# Patient Record
Sex: MALE | Race: WHITE | NOT HISPANIC OR LATINO | URBAN - METROPOLITAN AREA
[De-identification: names, ages, dates, MRNs, and addresses within clinical notes are randomized per-mention and may not be internally consistent; named-entity substitution may affect disease eponyms.]

---

## 2024-04-04 ENCOUNTER — OFFICE VISIT (OUTPATIENT)
Dept: URGENT CARE | Facility: CLINIC | Age: 49
End: 2024-04-04
Payer: MEDICAID

## 2024-04-04 VITALS
SYSTOLIC BLOOD PRESSURE: 128 MMHG | TEMPERATURE: 98.3 F | WEIGHT: 195 LBS | HEART RATE: 86 BPM | RESPIRATION RATE: 18 BRPM | DIASTOLIC BLOOD PRESSURE: 54 MMHG | OXYGEN SATURATION: 97 % | HEIGHT: 73 IN | BODY MASS INDEX: 25.84 KG/M2

## 2024-04-04 DIAGNOSIS — J02.9 VIRAL PHARYNGITIS: Primary | ICD-10-CM

## 2024-04-04 LAB — POC RAPID STREP: NEGATIVE

## 2024-04-04 PROCEDURE — 99213 OFFICE O/P EST LOW 20 MIN: CPT

## 2024-04-04 PROCEDURE — 87880 STREP A ASSAY W/OPTIC: CPT | Mod: QW

## 2024-04-04 PROCEDURE — 87880 STREP A ASSAY W/OPTIC: CPT

## 2024-04-04 RX ORDER — ALBUTEROL SULFATE 90 UG/1
2 AEROSOL, METERED RESPIRATORY (INHALATION) EVERY 6 HOURS PRN
COMMUNITY
Start: 2023-11-05

## 2024-04-04 RX ORDER — ESCITALOPRAM OXALATE 20 MG/1
20 TABLET ORAL DAILY
COMMUNITY
Start: 2024-02-23

## 2024-04-04 RX ORDER — ASPIRIN 81 MG/1
81 TABLET ORAL DAILY
COMMUNITY

## 2024-04-04 RX ORDER — HYDROXYZINE HYDROCHLORIDE 25 MG/1
25 TABLET, FILM COATED ORAL NIGHTLY PRN
COMMUNITY
Start: 2024-02-23

## 2024-04-04 RX ORDER — ALPRAZOLAM 0.25 MG/1
TABLET ORAL
COMMUNITY
Start: 2023-10-17

## 2024-04-04 NOTE — PROGRESS NOTES
[facility] URGENT CARE [place] NOTE:      Name: Hamilton Villalta, 48 y.o.    CSN:[id number]   MRN:[id number]    PCP: No primary care provider on file.    ALL:  No Known Allergies    History:    Chief Complaint: Sore Throat (Sore throat x 2 days. Known exposure to strep )    Encounter Date: 4/4/2024      HPI: The history was obtained from the patient. Hamilton is a 48 y.o. male, who presents with a chief complaint of Sore Throat (Sore throat x 2 days. Known exposure to strep ).  Patient states that his son currently has strep.  He has developed a sore throat over the last 2 days.  He does endorse some mild cough.  He has been taking over-the-counter DayQuil and he states this does improve his symptoms.  He denies fevers, nausea, vomiting, chest pain, shortness of breath, abdominal pain.    PMHx:    History reviewed. No pertinent past medical history.           Current Outpatient Medications   Medication Sig Dispense Refill    albuterol 90 mcg/actuation inhaler Inhale 2 puffs every 6 hours if needed for wheezing.      ALPRAZolam (Xanax) 0.25 mg tablet Take 1 tablet by mouth at bedtime as needed for Anxiety or Insomnia.      aspirin 81 mg EC tablet Take 1 tablet (81 mg) by mouth once daily.      escitalopram (Lexapro) 20 mg tablet Take 1 tablet (20 mg) by mouth once daily.      hydrOXYzine HCL (Atarax) 25 mg tablet Take 1 tablet (25 mg) by mouth as needed at bedtime for anxiety.       No current facility-administered medications for this visit.         PMSx:  History reviewed. No pertinent surgical history.    Fam Hx: No family history on file.    SOC. Hx:     Social History     Socioeconomic History    Marital status:      Spouse name: Not on file    Number of children: Not on file    Years of education: Not on file    Highest education level: Not on file   Occupational History    Not on file   Tobacco Use    Smoking status: Never    Smokeless tobacco: Never   Substance and Sexual Activity    Alcohol  use: Not on file    Drug use: Not on file    Sexual activity: Not on file   Other Topics Concern    Not on file   Social History Narrative    Not on file     Social Determinants of Health     Financial Resource Strain: Not on file   Food Insecurity: Not on file   Transportation Needs: Not on file   Physical Activity: Not on file   Stress: Not on file   Social Connections: Not on file   Intimate Partner Violence: Not on file   Housing Stability: Not on file         Vitals:    04/04/24 1722   BP: 128/54   Pulse: 86   Resp: 18   Temp: 36.8 °C (98.3 °F)   SpO2: 97%     88.5 kg (195 lb)          Physical Exam  Vitals reviewed.   Constitutional:       Appearance: Normal appearance.   HENT:      Right Ear: Tympanic membrane normal.      Left Ear: Tympanic membrane normal.      Nose: Nose normal.      Mouth/Throat:      Mouth: Mucous membranes are moist.      Pharynx: Oropharynx is clear. Posterior oropharyngeal erythema present.      Tonsils: No tonsillar exudate.   Eyes:      Conjunctiva/sclera: Conjunctivae normal.      Pupils: Pupils are equal, round, and reactive to light.   Cardiovascular:      Rate and Rhythm: Normal rate and regular rhythm.   Pulmonary:      Effort: Pulmonary effort is normal.      Breath sounds: Normal breath sounds.   Lymphadenopathy:      Cervical: No cervical adenopathy.   Skin:     General: Skin is warm.   Neurological:      General: No focal deficit present.      Mental Status: He is alert and oriented to person, place, and time.   Psychiatric:         Mood and Affect: Mood normal.         Behavior: Behavior normal.         I did personally review Hamilton's past medical history, surgical history, social history, as well as family history (when relevant).  In this case, I also oversaw the his drug management by reviewing his medication list, allergy list, as well as the medications that I prescribed during the UC course and/or recommended as an out-patient (including possible OTC medications such  as acetaminophen, NSAIDs , etc).    After reviewing the items above, I did look at previous medical documentation, such as recent hospitalizations, office visits, and/or recent consultations with PCP/specialist.                          SDOH:   Another factor that I considered in Hamilton's care was his Social Determinants of Health (SDOH). During this UC encounter, he did not have social determinants of health. Those SDOH influencing Hamilton's care are: none    LABORATORY @ RADIOLOGICAL IMAGING (if done):     Results for orders placed or performed in visit on 04/04/24 (from the past 24 hour(s))   POCT rapid strep A manually resulted   Result Value Ref Range    POC Rapid Strep Negative Negative        COURSE/MEDICAL DECISION MAKING:    Hamilton is a 48 y.o., who presents with a working diagnosis of   1. Viral pharyngitis      Hamilton was seen today for sore throat.  Diagnoses and all orders for this visit:  Viral pharyngitis (Primary)  -     POCT rapid strep A manually resulted    After my independent evaluation, he appears to have a self-limiting illness likely due to a viral pharyngitis.   At this time, there is a no evidence of pneumonia, hypoxia, OM, bacterial sinus infection, bacterial bronchitis, bacteremia, or sepsis.     In my medical opinion, I consider Hamilton to be low risk for any serious/life-threatening entity, and deem him stable for discharge. This is based on the information that was available to me at the time of this visit.  Discussed with patient that if his symptoms fail to improve or he worsens in any way he may need to be retested.    As we discussed, he is to return to our office or ER immediately if there is any worsening of his condition, such as increased cough, shortness of breath, persistent fevers, repeated vomiting, dehydration, or if his condition worsens at all.      Soheila Mccracken PA-C   Advanced Practice Provider  [facility] URGENT CARE

## 2025-01-27 DIAGNOSIS — J11.1 INFLUENZA: Primary | ICD-10-CM

## 2025-01-27 RX ORDER — OSELTAMIVIR PHOSPHATE 75 MG/1
75 CAPSULE ORAL EVERY 12 HOURS
Qty: 10 CAPSULE | Refills: 0 | Status: SHIPPED | OUTPATIENT
Start: 2025-01-27 | End: 2025-02-01

## 2025-01-27 NOTE — PROGRESS NOTES
[facility] URGENT CARE Telephone NOTE:    Name: Hamilton Villalta, 49 y.o.    CSN:[id number]   MRN:[id number]    PCP: No primary care provider on file.  ALL:  No Known Allergies      Date of call: 01/27/25  Time of Call: 1:28 PM    Connection was: received    Spoke with: Patient      Purpose:  Influenza exposure; requesting tx     Details:   Has rhinorrhea and mild HA, will provide Tamiflu 75mg to [facility]